# Patient Record
Sex: FEMALE | Race: WHITE | Employment: OTHER | URBAN - METROPOLITAN AREA
[De-identification: names, ages, dates, MRNs, and addresses within clinical notes are randomized per-mention and may not be internally consistent; named-entity substitution may affect disease eponyms.]

---

## 2017-08-11 ENCOUNTER — HOSPITAL ENCOUNTER (OUTPATIENT)
Dept: ENDOSCOPY | Age: 67
Discharge: HOME | End: 2017-08-11
Attending: INTERNAL MEDICINE | Admitting: INTERNAL MEDICINE

## 2019-08-24 ENCOUNTER — EMERGENCY (EMERGENCY)
Facility: HOSPITAL | Age: 69
LOS: 1 days | Discharge: HOME | End: 2019-08-24
Attending: EMERGENCY MEDICINE | Admitting: EMERGENCY MEDICINE
Payer: COMMERCIAL

## 2019-08-24 VITALS
RESPIRATION RATE: 16 BRPM | OXYGEN SATURATION: 98 % | TEMPERATURE: 98 F | SYSTOLIC BLOOD PRESSURE: 130 MMHG | DIASTOLIC BLOOD PRESSURE: 70 MMHG | HEART RATE: 80 BPM

## 2019-08-24 VITALS
TEMPERATURE: 98 F | OXYGEN SATURATION: 97 % | RESPIRATION RATE: 16 BRPM | DIASTOLIC BLOOD PRESSURE: 81 MMHG | WEIGHT: 108.91 LBS | HEART RATE: 80 BPM | HEIGHT: 64 IN | SYSTOLIC BLOOD PRESSURE: 152 MMHG

## 2019-08-24 PROCEDURE — 73030 X-RAY EXAM OF SHOULDER: CPT

## 2019-08-24 PROCEDURE — 73060 X-RAY EXAM OF HUMERUS: CPT | Mod: 26,RT

## 2019-08-24 PROCEDURE — 73030 X-RAY EXAM OF SHOULDER: CPT | Mod: 26,RT

## 2019-08-24 PROCEDURE — 99284 EMERGENCY DEPT VISIT MOD MDM: CPT | Mod: 25

## 2019-08-24 PROCEDURE — 73562 X-RAY EXAM OF KNEE 3: CPT

## 2019-08-24 PROCEDURE — 73060 X-RAY EXAM OF HUMERUS: CPT

## 2019-08-24 PROCEDURE — 73562 X-RAY EXAM OF KNEE 3: CPT | Mod: 26,RT

## 2019-08-24 PROCEDURE — 99284 EMERGENCY DEPT VISIT MOD MDM: CPT

## 2019-08-24 RX ORDER — OXYCODONE HYDROCHLORIDE 5 MG/1
1 TABLET ORAL
Qty: 9 | Refills: 0
Start: 2019-08-24 | End: 2019-08-26

## 2019-08-24 RX ORDER — CYCLOBENZAPRINE HYDROCHLORIDE 10 MG/1
10 TABLET, FILM COATED ORAL ONCE
Refills: 0 | Status: COMPLETED | OUTPATIENT
Start: 2019-08-24 | End: 2019-08-24

## 2019-08-24 RX ORDER — OXYCODONE HYDROCHLORIDE 5 MG/1
5 TABLET ORAL ONCE
Refills: 0 | Status: DISCONTINUED | OUTPATIENT
Start: 2019-08-24 | End: 2019-08-24

## 2019-08-24 RX ORDER — IBUPROFEN 200 MG
600 TABLET ORAL ONCE
Refills: 0 | Status: COMPLETED | OUTPATIENT
Start: 2019-08-24 | End: 2019-08-24

## 2019-08-24 RX ORDER — IBUPROFEN 200 MG
1 TABLET ORAL
Qty: 9 | Refills: 0
Start: 2019-08-24 | End: 2019-08-26

## 2019-08-24 RX ORDER — METFORMIN HYDROCHLORIDE 850 MG/1
0 TABLET ORAL
Qty: 0 | Refills: 0 | DISCHARGE

## 2019-08-24 RX ORDER — CYCLOBENZAPRINE HYDROCHLORIDE 10 MG/1
1 TABLET, FILM COATED ORAL
Qty: 9 | Refills: 0
Start: 2019-08-24 | End: 2019-08-26

## 2019-08-24 RX ORDER — RISEDRONATE SODIUM 25.8; 4.2 MG/1; MG/1
1 TABLET, FILM COATED ORAL
Qty: 0 | Refills: 0 | DISCHARGE

## 2019-08-24 RX ADMIN — CYCLOBENZAPRINE HYDROCHLORIDE 10 MILLIGRAM(S): 10 TABLET, FILM COATED ORAL at 01:58

## 2019-08-24 RX ADMIN — OXYCODONE HYDROCHLORIDE 5 MILLIGRAM(S): 5 TABLET ORAL at 02:36

## 2019-08-24 RX ADMIN — Medication 600 MILLIGRAM(S): at 02:36

## 2019-08-24 RX ADMIN — OXYCODONE HYDROCHLORIDE 5 MILLIGRAM(S): 5 TABLET ORAL at 01:58

## 2019-08-24 RX ADMIN — Medication 600 MILLIGRAM(S): at 01:58

## 2019-08-24 NOTE — ED PROVIDER NOTE - PROGRESS NOTE DETAILS
xray discussed, revd pain meds,   ambulatory  xray discussed, immobilization  expailed, pain meds explaiend, follow up discussed

## 2019-08-24 NOTE — ED PROVIDER NOTE - PHYSICAL EXAMINATION
appears uncomfortable, talking in short sentences  pupils a reactive, eomi,   mm moist, no oral injury, no facial pain  supple, no c/t/l spine tenderness, from, trachea in midline  Gato chest expansion, no chest wall tenderness, no rib tenderness, no deformity, no clavicular pain  S1 S2 distant  abd soft, non tender, no g/r,   no pelvic pain  moves all ext, no swelling noted except right shoulde swelling prox noted, distal arm sensory, motor and capillary intact  knee abrasion noted, distallegs no swelling from both distal lower ext  Neuro aao3, cn intact grossly, no focal deficits  skin no bruises, no swelling

## 2019-08-24 NOTE — ED PROVIDER NOTE - CLINICAL SUMMARY MEDICAL DECISION MAKING FREE TEXT BOX
68y old with fall right shoulder injury, xray, trauma evalaution, pain control, slin, immoblizartion, ambulatn po tolerance  follow up. patient has a daughter abnd a daughter in law in [place]

## 2019-08-24 NOTE — ED ADULT TRIAGE NOTE - CHIEF COMPLAINT QUOTE
'She tripped and fell, landed on right shoulder', s/p fall around 9 pm tonight, c/o pain and unable to lift right arm, right upper arm swollen.

## 2019-08-24 NOTE — ED PROVIDER NOTE - OBJECTIVE STATEMENT
68y odl otherwise in good state of health, with complaints of fall, no loc, complaints of right shoulder and arm pain, pain is sharp, describes ripped, saw herself fall, recall events, did not hit head. patient is visiting

## 2019-08-24 NOTE — ED PROVIDER NOTE - NS ED ROS FT
no weight change, no fever or chills  no rash, no bruises  no visual changes no eye discharge  no cough cold or congestion,   no sob, no chest pain  no abd pain, no n/v/d  no hematuria, no change in urinary habits  no joint pain, no deformity  no headache, no paresthesia

## 2019-08-24 NOTE — ED ADULT NURSE NOTE - NSIMPLEMENTINTERV_GEN_ALL_ED
Implemented All Fall Risk Interventions:  Lee to call system. Call bell, personal items and telephone within reach. Instruct patient to call for assistance. Room bathroom lighting operational. Non-slip footwear when patient is off stretcher. Physically safe environment: no spills, clutter or unnecessary equipment. Stretcher in lowest position, wheels locked, appropriate side rails in place. Provide visual cue, wrist band, yellow gown, etc. Monitor gait and stability. Monitor for mental status changes and reorient to person, place, and time. Review medications for side effects contributing to fall risk. Reinforce activity limits and safety measures with patient and family.

## 2020-10-30 RX ORDER — LORATADINE 10 MG/1
10 TABLET ORAL DAILY PRN
COMMUNITY

## 2020-10-30 RX ORDER — ESCITALOPRAM OXALATE 10 MG/1
20 TABLET ORAL DAILY
COMMUNITY

## 2020-10-30 RX ORDER — METOPROLOL SUCCINATE 25 MG/1
12.5 TABLET, EXTENDED RELEASE ORAL EVERY EVENING
COMMUNITY

## 2020-10-30 RX ORDER — UBIDECARENONE 100 MG
CAPSULE ORAL DAILY
COMMUNITY

## 2020-10-30 RX ORDER — PREDNISONE 1 MG/1
5 TABLET ORAL DAILY
COMMUNITY

## 2020-10-30 RX ORDER — ANTIARTHRITIC COMBINATION NO.2 900 MG
TABLET ORAL DAILY
COMMUNITY

## 2020-10-30 NOTE — PROGRESS NOTES
Name_______________________________________Printed:____________________  Date and time of surgery___11/4/2020  1000_____________________Arrival Time:__0830  hosp-endo______________   1. The instructions given regarding when and if a patient needs to stop oral intake prior to surgery varies. Follow the specific instructions you were given                  XXX___Nothing to eat or to drink after Midnight the night before.                             ____Endoscopy patient follow your DRS instructions-generally you will be doing a part of the prep after Midnight                   ____Carbo loading or ERAS instructions will be given to select patients-if you have been given those instructions -please do the following                           The evening before your surgery after dinner before midnight drink 40 ounces of gatorade. If you are diabetic use sugar free. The morning of surgery drink 40 ounces of water. This needs to be finished 3 hours prior to your surgery start time. 2. Take the following pills with a small sip of water on the morning of surgery________pt. does not want to take any meds prior to procedure___________________________________________                  Do not take blood pressure medications ending in pril or sartan the schuyler prior to surgery or the morning of surgery_   3. Aspirin, Ibuprofen, Advil, Naproxen, Vitamin E and other Anti-inflammatory products and supplements should be stopped for 5 -7days before surgery or as directed by your physician. 4. Check with your Doctor regarding stopping Plavix, Coumadin,Eliquis, Lovenox,Effient,Pradaxa,Xarelto, Fragmin or other blood thinners and follow their instructions. 5. Do not smoke, and do not drink any alcoholic beverages 24 hours prior to surgery. This includes NA Beer. Refrain from the usage of any recreational drugs. 6. You may brush your teeth and gargle the morning of surgery. DO NOT SWALLOW WATER   7.  You MUST make arrangements for a responsible adult to stay on site while you are here and take you home after your surgery. You will not be allowed to leave alone or drive yourself home. It is strongly suggested someone stay with you the first 24 hrs. Your surgery will be cancelled if you do not have a ride home. 8. A parent/legal guardian must accompany a child scheduled for surgery and plan to stay at the hospital until the child is discharged. Please do not bring other children with you. 9. Please wear simple, loose fitting clothing to the hospital.  Natty Alba not bring valuables (money, credit cards, checkbooks, etc.) Do not wear any makeup (including no eye makeup) or nail polish on your fingers or toes. 10. DO NOT wear any jewelry or piercings on day of surgery. All body piercing jewelry must be removed. 11. If you have ___dentures, they will be removed before going to the OR; we will provide you a container. If you wear ___contact lenses or ___glasses, they will be removed; please bring a case for them. 12. Please see your family doctor/pediatrician for a history & physical and/or concerning medications. Bring any test results/reports from your physician's office. PCP__________________Phone___________H&P Appt. Date________             13 If you  have a Living Will and Durable Power of  for Healthcare, please bring in a copy. 15. Notify your Surgeon if you develop any illness between now and surgery  time, cough, cold, fever, sore throat, nausea, vomiting, etc.  Please notify your surgeon if you experience dizziness, shortness of breath or blurred vision between now & the time of your surgery             15. DO NOT shave your operative site 96 hours prior to surgery. For face & neck surgery, men may use an electric razor 48 hours prior to surgery. 16. Shower the night before or morning of surgery using an antibacterial soap or as you have been instructed.              17. To provide excellent care visitors will be limited to one in the room at any given time. 18.  Please bring picture ID and insurance card. 19.  Visit our web site for additional information:  [employer]. [employer]/patient-eprep              20.During flu season no children under the age of 15 are permitted in the hospital for the safety of all patients. 21. If you take a long acting insulin in the evening only  take half of your usual  dose the night  before your procedure              22. If you use a c-pap please bring DOS if staying overnight,             23.For your convenience [facility] has a pharmacy on site to fill your prescriptions. 24. If you use oxygen and have a portable tank please bring it  with you the DOS             25. Bring a complete list of all your medications with name and dose include any supplements. 26. Other__________________________________________   *Please call pre admission testing if you any further questions   [place]         [address]    [phone] [facility]  [phone]   [address] [place] [address]       All above information reviewed with patient in person or by phone. Patient verbalizes understanding. All questions and concerns addressed. Patient/Rep____________________                                                                                                                                    There is a one visitor policy at [facility] for all surgeries and endoscopies. Whether the visitor can stay or will be asked to wait in the car will depend on the current policy and if social distancing can be maintained. The policy is subject to change at any time. Please make sure the visitor has a cell phone that is on,charged and able to accept calls, as this may be the way that the staff communicates with them. Pain management is NO VISITOR policyThe patients ride is expected to remain in the car with a cell phone for communication. If the ride is leaving the hospital grounds please make sure they are back in time for pickup. Have the patient inform the staff on arrival what their rides plans are while the patient is in the facility. At the MAIN there is one visitor allowed. Please note that the visitor policy is subject to change. Will do rapid COVID day of procedure per .

## 2020-11-04 ENCOUNTER — ANESTHESIA (OUTPATIENT)
Dept: ENDOSCOPY | Age: 70
End: 2020-11-04
Payer: MEDICARE

## 2020-11-04 ENCOUNTER — HOSPITAL ENCOUNTER (OUTPATIENT)
Age: 70
Setting detail: OUTPATIENT SURGERY
Discharge: HOME | End: 2020-11-04
Attending: INTERNAL MEDICINE | Admitting: INTERNAL MEDICINE
Payer: MEDICARE

## 2020-11-04 ENCOUNTER — ANESTHESIA EVENT (OUTPATIENT)
Dept: ENDOSCOPY | Age: 70
End: 2020-11-04
Payer: MEDICARE

## 2020-11-04 VITALS
WEIGHT: 124 LBS | SYSTOLIC BLOOD PRESSURE: 134 MMHG | RESPIRATION RATE: 16 BRPM | DIASTOLIC BLOOD PRESSURE: 74 MMHG | HEIGHT: 64 IN | BODY MASS INDEX: 21.17 KG/M2 | OXYGEN SATURATION: 98 % | HEART RATE: 61 BPM | TEMPERATURE: 98 F

## 2020-11-04 VITALS — DIASTOLIC BLOOD PRESSURE: 59 MMHG | OXYGEN SATURATION: 99 % | SYSTOLIC BLOOD PRESSURE: 109 MMHG

## 2020-11-04 LAB — SARS-COV-2, NAAT: NOT DETECTED

## 2020-11-04 PROCEDURE — 88305 TISSUE EXAM BY PATHOLOGIST: CPT

## 2020-11-04 PROCEDURE — C1726 CATH, BAL DIL, NON-VASCULAR: HCPCS | Performed by: INTERNAL MEDICINE

## 2020-11-04 PROCEDURE — 3700000001 HC ADD 15 MINUTES (ANESTHESIA): Performed by: INTERNAL MEDICINE

## 2020-11-04 PROCEDURE — 3700000000 HC ANESTHESIA ATTENDED CARE: Performed by: INTERNAL MEDICINE

## 2020-11-04 PROCEDURE — 2580000003 HC RX 258: Performed by: NURSE ANESTHETIST, CERTIFIED REGISTERED

## 2020-11-04 PROCEDURE — 7100000000 HC PACU RECOVERY - FIRST 15 MIN: Performed by: INTERNAL MEDICINE

## 2020-11-04 PROCEDURE — 3609017700 HC EGD DILATION GASTRIC/DUODENAL STRICTURE: Performed by: INTERNAL MEDICINE

## 2020-11-04 PROCEDURE — 7100000001 HC PACU RECOVERY - ADDTL 15 MIN: Performed by: INTERNAL MEDICINE

## 2020-11-04 PROCEDURE — 7100000011 HC PHASE II RECOVERY - ADDTL 15 MIN: Performed by: INTERNAL MEDICINE

## 2020-11-04 PROCEDURE — 3609012400 HC EGD TRANSORAL BIOPSY SINGLE/MULTIPLE: Performed by: INTERNAL MEDICINE

## 2020-11-04 PROCEDURE — 2580000003 HC RX 258: Performed by: ANESTHESIOLOGY

## 2020-11-04 PROCEDURE — U0002 COVID-19 LAB TEST NON-CDC: HCPCS

## 2020-11-04 PROCEDURE — 2709999900 HC NON-CHARGEABLE SUPPLY: Performed by: INTERNAL MEDICINE

## 2020-11-04 PROCEDURE — 7100000010 HC PHASE II RECOVERY - FIRST 15 MIN: Performed by: INTERNAL MEDICINE

## 2020-11-04 PROCEDURE — 2500000003 HC RX 250 WO HCPCS: Performed by: NURSE ANESTHETIST, CERTIFIED REGISTERED

## 2020-11-04 PROCEDURE — 6360000002 HC RX W HCPCS: Performed by: NURSE ANESTHETIST, CERTIFIED REGISTERED

## 2020-11-04 RX ORDER — PROPOFOL 10 MG/ML
INJECTION, EMULSION INTRAVENOUS PRN
Status: DISCONTINUED | OUTPATIENT
Start: 2020-11-04 | End: 2020-11-04 | Stop reason: SDUPTHER

## 2020-11-04 RX ORDER — SODIUM CHLORIDE 9 MG/ML
INJECTION, SOLUTION INTRAVENOUS CONTINUOUS PRN
Status: DISCONTINUED | OUTPATIENT
Start: 2020-11-04 | End: 2020-11-04 | Stop reason: SDUPTHER

## 2020-11-04 RX ORDER — LIDOCAINE HYDROCHLORIDE 20 MG/ML
INJECTION, SOLUTION EPIDURAL; INFILTRATION; INTRACAUDAL; PERINEURAL PRN
Status: DISCONTINUED | OUTPATIENT
Start: 2020-11-04 | End: 2020-11-04 | Stop reason: SDUPTHER

## 2020-11-04 RX ORDER — SODIUM CHLORIDE 9 MG/ML
INJECTION, SOLUTION INTRAVENOUS CONTINUOUS
Status: DISCONTINUED | OUTPATIENT
Start: 2020-11-04 | End: 2020-11-04 | Stop reason: HOSPADM

## 2020-11-04 RX ADMIN — LIDOCAINE HYDROCHLORIDE 100 MG: 20 INJECTION, SOLUTION EPIDURAL; INFILTRATION; INTRACAUDAL; PERINEURAL at 10:07

## 2020-11-04 RX ADMIN — SODIUM CHLORIDE: 9 INJECTION, SOLUTION INTRAVENOUS at 10:07

## 2020-11-04 RX ADMIN — PROPOFOL 250 MG: 10 INJECTION, EMULSION INTRAVENOUS at 10:07

## 2020-11-04 RX ADMIN — SODIUM CHLORIDE: 9 INJECTION, SOLUTION INTRAVENOUS at 09:23

## 2020-11-04 ASSESSMENT — PAIN SCALES - GENERAL
PAINLEVEL_OUTOF10: 0

## 2020-11-04 ASSESSMENT — PULMONARY FUNCTION TESTS
PIF_VALUE: 0
PIF_VALUE: 1
PIF_VALUE: 0

## 2020-11-04 ASSESSMENT — PAIN - FUNCTIONAL ASSESSMENT: PAIN_FUNCTIONAL_ASSESSMENT: 0-10

## 2020-11-04 ASSESSMENT — ENCOUNTER SYMPTOMS: SHORTNESS OF BREATH: 0

## 2020-11-04 NOTE — H&P
mg by mouth daily      therapeutic multivitamin-minerals (THERAGRAN-M) tablet Take 1 tablet by mouth daily. Allergies:  Adhesive tape; Fentanyl; Other; Oxycodone; Percocet [oxycodone-acetaminophen]; Promethazine hcl; Propoxyphene; Erythromycin; and Penicillins        Social History:   Social History     Tobacco Use    Smoking status: Never Smoker    Smokeless tobacco: Never Used   Substance Use Topics    Alcohol use: Yes     Alcohol/week: 0.0 standard drinks     Comment: occasionally     Family History:   Family History   Problem Relation Age of Onset    Kidney Cancer Mother     Heart Attack Father     Kidney Cancer Sister     Kidney Cancer Brother        PHYSICAL EXAM:      BP (!) 143/78   Pulse 61   Temp 98.1 °F (36.7 °C) (Temporal)   Resp 16   Ht 5' 4\" (1.626 m)   Wt 124 lb (56.2 kg)   SpO2 99%   BMI 21.28 kg/m²  I        Heart:  RRR,  Normal S1S2    Lungs:  CTA Bilat, normal effort    Abdomen:  ND NT      ASA Grade:  ASA 2 - Patient with mild systemic disease with no functional limitations    Mallampati Class:  Class I: Soft palate, uvula, fauces, pillars visible  __________  Class II: Soft palate, uvula, fauces visible  __________   Class III: Soft palate, base of uvula visible  __________  Class IV: Hard palate only visible   __________      ASSESSMENT AND PLAN:    1. Patient is a 78 y.o. female here for EGD with deep sedation  2. Procedure options, risks and benefits reviewed with patient. Patient expresses understanding.

## 2020-11-04 NOTE — PROGRESS NOTES
Reviewed problem list, assessment, H&P, and checklist preoperatively. Scope verified using 2 person system. Family in car in parking lot, Dr. Jono Black to call.

## 2020-11-04 NOTE — PROGRESS NOTES
PT moved to phase II. Pt alert and oriented. Room air. Drinking soda. Denies any pain. Spoke with MD.  Patient education given  and the patient expresses understanding and acceptance of instructions. Aracelis Castro 11/4/2020 11:54 AM  Discharge instructions reviewed with patient/responsible adult. All home medications have been reviewed, questions answered and patient verbalized understanding. Discharge instructions signed and copies given.

## 2020-11-04 NOTE — ANESTHESIA PRE PROCEDURE
Department of Anesthesiology  Preprocedure Note       Name:  Minerva Alicia   Age:  78 y.o.  :  1950                                          MRN:  [id number]         Date:  2020      Surgeon: Erika Morales):  Savannah Anne MD    Procedure: Procedure(s):  EGD DIAGNOSTIC ONLY    Medications prior to admission:   Prior to Admission medications    Medication Sig Start Date End Date Taking? Authorizing Provider   metoprolol succinate (TOPROL XL) 25 MG extended release tablet Take 12.5 mg by mouth every evening    Yes Historical Provider, MD   TACROLIMUS PO Take by mouth daily Takes 5 tabs daily   Yes Historical Provider, MD   predniSONE (DELTASONE) 5 MG tablet Take 5 mg by mouth daily   Yes Historical Provider, MD   loratadine (CLARITIN) 10 MG tablet Take 10 mg by mouth daily   Yes Historical Provider, MD   vitamin D (D3-1000) 25 MCG (1000 UT) CAPS Take by mouth daily   Yes Historical Provider, MD   cyanocobalamin 1000 MCG tablet Take 1,000 mcg by mouth daily   Yes Historical Provider, MD   Biotin 5000 MCG TABS Take by mouth daily   Yes Historical Provider, MD   escitalopram (LEXAPRO) 10 MG tablet Take 10 mg by mouth daily   Yes Historical Provider, MD   therapeutic multivitamin-minerals (THERAGRAN-M) tablet Take 1 tablet by mouth daily. Historical Provider, MD       Current medications:    No current facility-administered medications for this encounter. Allergies:     Allergies   Allergen Reactions    Adhesive Tape      Skin red and blister may use paper tape  Skin red and blister may use paper tape    Fentanyl      hallucinations    Other      darvocet causes hallucinations    Oxycodone      hallucinations    Percocet [Oxycodone-Acetaminophen]     Promethazine Hcl      Hallucinations     Propoxyphene     Erythromycin Nausea Only and Nausea And Vomiting    Penicillins Rash and Swelling       Problem List:    Patient Active Problem List   Diagnosis Code    Cerebral aneurysm, nonruptured I67.1  Intracerebral hemorrhage (HCC) I61.9    Essential hypertension I10    Polycystic kidney Q61.3    Convulsions ([address] R56.9    Sleep apnea G47.30    Depressive disorder, not elsewhere classified F32.9    Allergic rhinitis J30.9    Malignant neoplasm of skin C44.90    Elevated uric acid in blood E79.0    End-stage renal disease (HCC) N18.6    Tendon contracture M62.40    Acquired hallux valgus M20.10    Capsulitis of foot M77.8       Past Medical History:        Diagnosis Date    Allergic rhinitis     Basal cell carcinoma     Capsulitis of foot 9/21/2015    Cerebral aneurysm     Depression     Hemorrhage intracerebral     Hernia of unspecified site of abdominal cavity without mention of obstruction or gangrene 1985    Hypertension     Mitral valve replaced     Polycystic kidney disease     Prolonged emergence from general anesthesia     Spinal stenoses     Unspecified sleep apnea     does not use CPAP       Past Surgical History:        Procedure Laterality Date    BRAIN ANEURYSM SURGERY  2004    HERNIA REPAIR      abdominal several times    HYSTERECTOMY  1994    KIDNEY REMOVAL Bilateral 2014    KIDNEY TRANSPLANT  07/2014    live donor   Eric Spina MITRAL VALVE REPAIR  2008    OTHER SURGICAL HISTORY  2006    basal cell removed from rt breast    OTHER SURGICAL HISTORY  2004    kidney decortication    TONSILLECTOMY      as child       Social History:    Social History     Tobacco Use    Smoking status: Never Smoker    Smokeless tobacco: Never Used   Substance Use Topics    Alcohol use:  Yes     Alcohol/week: 0.0 standard drinks     Comment: occasionally                                Counseling given: Not Answered      Vital Signs (Current):   Vitals:    10/30/20 1122 10/30/20 1132   Weight: 124 lb (56.2 kg) 124 lb (56.2 kg)   Height: 5' 4\" (1.626 m) 5' 4\" (1.626 m)                                              BP Readings from Last 3 Encounters:   09/28/15 105/60   09/21/15 106/61   01/09/14 126/74       NPO Status:                                                                                 BMI:   Wt Readings from Last 3 Encounters:   10/30/20 124 lb (56.2 kg)   10/19/15 128 lb (58.1 kg)   09/28/15 128 lb 15.5 oz (58.5 kg)     Body mass index is 21.28 kg/m². CBC:   Lab Results   Component Value Date    WBC 6.3 06/20/2014    RBC 3.51 06/20/2014    HGB 10.5 06/20/2014    HCT 32.4 06/20/2014    MCV 92.5 06/20/2014    RDW 16.4 06/20/2014     06/20/2014       CMP:   Lab Results   Component Value Date     06/20/2014    K 4.5 06/20/2014    CL 94 06/20/2014    CO2 30 06/20/2014    BUN 25 06/20/2014    CREATININE 4.52 06/20/2014    GFRAA 12 06/20/2014    AGRATIO 1.5 06/20/2014    AGRATIO 1.5 06/20/2014    LABGLOM 10 06/20/2014    LABGLOM 19 12/29/2011    GLUCOSE 94 06/20/2014    GLUCOSE 93 12/29/2011    PROT 6.7 06/20/2014    PROT 6.7 06/20/2014    PROT 6.8 12/29/2011    CALCIUM 9.5 06/20/2014    BILITOT 0.9 06/20/2014    BILITOT 0.9 06/20/2014    ALKPHOS 66 06/20/2014    ALKPHOS 66 06/20/2014    AST 18 06/20/2014    AST 18 06/20/2014    ALT 14 06/20/2014    ALT 14 06/20/2014       POC Tests: No results for input(s): POCGLU, POCNA, POCK, POCCL, POCBUN, POCHEMO, POCHCT in the last 72 hours.     Coags:   Lab Results   Component Value Date    PROTIME 12.0 06/20/2014    INR 1.1 06/20/2014    APTT 35.9 06/20/2014       HCG (If Applicable): No results found for: PREGTESTUR, PREGSERUM, HCG, HCGQUANT     ABGs: No results found for: PHART, PO2ART, XOR0NLL, YBK1WMD, BEART, U8IJPRWZ     Type & Screen (If Applicable):  Lab Results   Component Value Date    LABABO O 06/20/2014    LABRH Positive 06/20/2014       Drug/Infectious Status (If Applicable):  No results found for: HIV, HEPCAB    COVID-19 Screening (If Applicable): No results found for: COVID19      Anesthesia Evaluation  Patient summary reviewed and Nursing notes reviewed no history of anesthetic complications:   Airway: Mallampati: II  TM distance: >3 FB   Neck ROM: full  Mouth opening: > = 3 FB Dental:    (+) caps      Pulmonary:   (+) sleep apnea:      (-) asthma and shortness of breath                           Cardiovascular:    (+) hypertension:, valvular problems/murmurs (s/p MV repair):,     (-)  angina                Neuro/Psych:      (-) CVA            ROS comment: Prior aneurysm GI/Hepatic/Renal:   (+) renal disease (PCKD):,      (-) GERD and liver disease       Endo/Other:        (-) diabetes mellitus, hypothyroidism               Abdominal:           Vascular:     - PVD. Anesthesia Plan      MAC     ASA 3       Induction: intravenous. Anesthetic plan and risks discussed with patient. Plan discussed with CRNA.                   Remy Parker MD   11/4/2020

## 2020-11-04 NOTE — ANESTHESIA POSTPROCEDURE EVALUATION
Department of Anesthesiology  Postprocedure Note    Patient: Tyrone Patricia  MRN: [id number]  YOB: 1950  Date of evaluation: 11/4/2020  Time:  11:56 AM     Procedure Summary     Date:  11/04/20 Room / Location:  [address] [place] [address]    Anesthesia Start:  1007 Anesthesia Stop:  1027    Procedures:       EGD DILATION BALLOON (N/A Abdomen)      EGD BIOPSY (N/A Abdomen) Diagnosis:  (DYSPHAGIA R13.10)    Surgeon:  Yoan Pool MD Responsible Provider:  Maureen Bullock MD    Anesthesia Type:  MAC ASA Status:  3          Anesthesia Type: MAC    Carmen Phase I: Carmen Score: 10    Carmen Phase II: Carmen Score: 10    Last vitals: Reviewed and per EMR flowsheets.        Anesthesia Post Evaluation    Patient location during evaluation: PACU  Level of consciousness: awake  Airway patency: patent  Complications: no  Cardiovascular status: hemodynamically stable  Respiratory status: acceptable

## 2020-11-04 NOTE — PROGRESS NOTES
Pt received into bay 1 from Endo. Pt drowsy, yet arousable. Vss. Pt stable. Report obtained. Pt denies any pain. Will monitor.

## 2021-02-11 ENCOUNTER — OFFICE VISIT (OUTPATIENT)
Dept: PRIMARY CARE CLINIC | Age: 71
End: 2021-02-11
Payer: MEDICARE

## 2021-02-11 DIAGNOSIS — Z01.818 PRE-OP TESTING: Primary | ICD-10-CM

## 2021-02-11 PROCEDURE — 99211 OFF/OP EST MAY X REQ PHY/QHP: CPT | Performed by: NURSE PRACTITIONER

## 2021-02-11 PROCEDURE — G8428 CUR MEDS NOT DOCUMENT: HCPCS | Performed by: NURSE PRACTITIONER

## 2021-02-11 PROCEDURE — G8420 CALC BMI NORM PARAMETERS: HCPCS | Performed by: NURSE PRACTITIONER

## 2021-02-11 NOTE — PROGRESS NOTES
Can Lezama received a viral test for COVID-19. They were educated on isolation and quarantine as appropriate. For any symptoms, they were directed to seek care from their PCP, given contact information to establish with a doctor, directed to an urgent care or the emergency room.

## 2021-02-11 NOTE — PATIENT INSTRUCTIONS

## 2021-02-12 LAB — SARS-COV-2, NAA: NOT DETECTED

## 2021-02-18 ENCOUNTER — HOSPITAL ENCOUNTER (OUTPATIENT)
Dept: ENDOSCOPY | Age: 71
Discharge: HOME | End: 2021-02-18
Payer: MEDICARE

## 2021-02-18 PROCEDURE — 3609015500 HC GASTRIC/DUODENAL MOTILITY &/OR MANOMETRY STUDY

## 2021-02-18 NOTE — PROGRESS NOTES
Time in Room: 0822   Patient verbalized understanding of Esophageal manometry study. Probe inserted into right nostril with no resistance. Study completed. Discharge instructions given. Patient denied further questions, nausea or pain. Walked to exit by this RN.   Time out of Room: 4218

## 2021-12-06 NOTE — PROGRESS NOTES
Patient reached ____ yes  __X___ no   VM instructions left __X__ yes   phone number ___[phone]_____                                ____ no-office notified          Date __12/9/21_______  Time _1000______  Arrival ___0830  hosp-endo___    Nothing to eat or drink after midnight-follow your doctors prep instructions-this may include taking a second dose of your prep after midnight  Responsible adult 25 or older to stay on site while you are here-drive you home-stay with you after  Follow any instructions your doctors office has given you  Bring a complete list of all your medications and supplements including name,dose,how often taken the day of your procedure  If you normally take the following medications in the morning please do so the AM of your procedure with a small sip of water       Heart,blood pressure,seizure,thyroid or breathing medications-use your inhalers       DO NOT take blood pressure medications ending in \"seth\" or \"pril\" the AM of procedure or evening prior  Take half or your normal dose of any long acting insulins the night before your procedure-do not take any diabetic medications the AM of procedure  Follow your doctors instructions regarding stopping or taking  any blood thinners-if you do not have instructions-call them  Any questions call your doctor  Other ______________________________________________________________      COVID TEST        __X_ Covid test to be done 3-5 days prior to scheduled surgery patient aware they are REQUIRED to bring a copy of the negative result DOS-if they receive a positive result to notify their surgeon. If known-indicate where patient is getting covid test done          _____________________________________________    ___ Rapid - DOS    ___ Other________________________________________            Jeannetta Ganong POLICY(subject to change)         There is a one visitor policy at [facility] for all surgeries and endoscopies. Whether the visitor can stay or will be asked to wait in the car will depend on the current policy and if social distancing can be maintained. The policy is subject to change at any time. Please make sure the visitor has a cell phone that is on,charged and able to accept calls, as this may be the way that the staff communicates with them. Pain management is NO VISITOR policyThe patients ride is expected to remain in the car with a cell phone for communication. If the ride is leaving the hospital grounds please make sure they are back in time for pickup. Have the patient inform the staff on arrival what their rides plans are while the patient is in the facility. At the MAIN there is one visitor allowed. Please note that the visitor policy is subject to change.

## 2021-12-09 ENCOUNTER — HOSPITAL ENCOUNTER (OUTPATIENT)
Age: 71
Setting detail: OUTPATIENT SURGERY
Discharge: HOME | End: 2021-12-09
Attending: INTERNAL MEDICINE | Admitting: INTERNAL MEDICINE
Payer: MEDICARE

## 2021-12-09 ENCOUNTER — ANESTHESIA EVENT (OUTPATIENT)
Dept: ENDOSCOPY | Age: 71
End: 2021-12-09
Payer: MEDICARE

## 2021-12-09 ENCOUNTER — ANESTHESIA (OUTPATIENT)
Dept: ENDOSCOPY | Age: 71
End: 2021-12-09
Payer: MEDICARE

## 2021-12-09 VITALS — DIASTOLIC BLOOD PRESSURE: 59 MMHG | OXYGEN SATURATION: 100 % | SYSTOLIC BLOOD PRESSURE: 99 MMHG

## 2021-12-09 VITALS
TEMPERATURE: 97.7 F | BODY MASS INDEX: 19.46 KG/M2 | WEIGHT: 114 LBS | HEIGHT: 64 IN | SYSTOLIC BLOOD PRESSURE: 126 MMHG | RESPIRATION RATE: 16 BRPM | HEART RATE: 56 BPM | OXYGEN SATURATION: 100 % | DIASTOLIC BLOOD PRESSURE: 69 MMHG

## 2021-12-09 PROCEDURE — 7100000011 HC PHASE II RECOVERY - ADDTL 15 MIN: Performed by: INTERNAL MEDICINE

## 2021-12-09 PROCEDURE — 2580000003 HC RX 258: Performed by: ANESTHESIOLOGY

## 2021-12-09 PROCEDURE — 6360000002 HC RX W HCPCS: Performed by: NURSE ANESTHETIST, CERTIFIED REGISTERED

## 2021-12-09 PROCEDURE — 2500000003 HC RX 250 WO HCPCS: Performed by: NURSE ANESTHETIST, CERTIFIED REGISTERED

## 2021-12-09 PROCEDURE — 7100000010 HC PHASE II RECOVERY - FIRST 15 MIN: Performed by: INTERNAL MEDICINE

## 2021-12-09 PROCEDURE — 3700000001 HC ADD 15 MINUTES (ANESTHESIA): Performed by: INTERNAL MEDICINE

## 2021-12-09 PROCEDURE — 2709999900 HC NON-CHARGEABLE SUPPLY: Performed by: INTERNAL MEDICINE

## 2021-12-09 PROCEDURE — 3609027000 HC COLONOSCOPY: Performed by: INTERNAL MEDICINE

## 2021-12-09 PROCEDURE — 3700000000 HC ANESTHESIA ATTENDED CARE: Performed by: INTERNAL MEDICINE

## 2021-12-09 RX ORDER — LABETALOL HYDROCHLORIDE 5 MG/ML
5 INJECTION, SOLUTION INTRAVENOUS EVERY 10 MIN PRN
Status: DISCONTINUED | OUTPATIENT
Start: 2021-12-09 | End: 2021-12-09 | Stop reason: HOSPADM

## 2021-12-09 RX ORDER — SODIUM CHLORIDE 0.9 % (FLUSH) 0.9 %
5-40 SYRINGE (ML) INJECTION PRN
Status: DISCONTINUED | OUTPATIENT
Start: 2021-12-09 | End: 2021-12-09 | Stop reason: HOSPADM

## 2021-12-09 RX ORDER — SODIUM CHLORIDE 0.9 % (FLUSH) 0.9 %
5-40 SYRINGE (ML) INJECTION EVERY 12 HOURS SCHEDULED
Status: DISCONTINUED | OUTPATIENT
Start: 2021-12-09 | End: 2021-12-09 | Stop reason: HOSPADM

## 2021-12-09 RX ORDER — PROPOFOL 10 MG/ML
INJECTION, EMULSION INTRAVENOUS PRN
Status: DISCONTINUED | OUTPATIENT
Start: 2021-12-09 | End: 2021-12-09 | Stop reason: SDUPTHER

## 2021-12-09 RX ORDER — LIDOCAINE HYDROCHLORIDE 20 MG/ML
INJECTION, SOLUTION EPIDURAL; INFILTRATION; INTRACAUDAL; PERINEURAL PRN
Status: DISCONTINUED | OUTPATIENT
Start: 2021-12-09 | End: 2021-12-09 | Stop reason: SDUPTHER

## 2021-12-09 RX ORDER — AZATHIOPRINE 50 MG/1
50 TABLET ORAL DAILY
COMMUNITY

## 2021-12-09 RX ORDER — ONDANSETRON 2 MG/ML
4 INJECTION INTRAMUSCULAR; INTRAVENOUS
Status: DISCONTINUED | OUTPATIENT
Start: 2021-12-09 | End: 2021-12-09 | Stop reason: HOSPADM

## 2021-12-09 RX ORDER — AMLODIPINE BESYLATE 2.5 MG/1
2.5 TABLET ORAL DAILY
COMMUNITY

## 2021-12-09 RX ORDER — SODIUM CHLORIDE 9 MG/ML
25 INJECTION, SOLUTION INTRAVENOUS PRN
Status: DISCONTINUED | OUTPATIENT
Start: 2021-12-09 | End: 2021-12-09 | Stop reason: HOSPADM

## 2021-12-09 RX ORDER — SODIUM CHLORIDE 9 MG/ML
INJECTION, SOLUTION INTRAVENOUS CONTINUOUS
Status: DISCONTINUED | OUTPATIENT
Start: 2021-12-09 | End: 2021-12-09 | Stop reason: HOSPADM

## 2021-12-09 RX ADMIN — LIDOCAINE HYDROCHLORIDE 100 MG: 20 INJECTION, SOLUTION EPIDURAL; INFILTRATION; INTRACAUDAL; PERINEURAL at 10:01

## 2021-12-09 RX ADMIN — PROPOFOL 50 MG: 10 INJECTION, EMULSION INTRAVENOUS at 10:01

## 2021-12-09 RX ADMIN — SODIUM CHLORIDE: 9 INJECTION, SOLUTION INTRAVENOUS at 09:19

## 2021-12-09 RX ADMIN — PROPOFOL 50 MG: 10 INJECTION, EMULSION INTRAVENOUS at 10:05

## 2021-12-09 RX ADMIN — PROPOFOL 50 MG: 10 INJECTION, EMULSION INTRAVENOUS at 10:16

## 2021-12-09 RX ADMIN — PROPOFOL 50 MG: 10 INJECTION, EMULSION INTRAVENOUS at 10:10

## 2021-12-09 ASSESSMENT — PULMONARY FUNCTION TESTS
PIF_VALUE: 0
PIF_VALUE: 1
PIF_VALUE: 0

## 2021-12-09 ASSESSMENT — ENCOUNTER SYMPTOMS: SHORTNESS OF BREATH: 0

## 2021-12-09 ASSESSMENT — PAIN - FUNCTIONAL ASSESSMENT: PAIN_FUNCTIONAL_ASSESSMENT: 0-10

## 2021-12-09 NOTE — ANESTHESIA PRE PROCEDURE
Department of Anesthesiology  Preprocedure Note       Name:  Magui Guerra   Age:  66 y.o.  :  1950                                          MRN:  [id number]         Date:  2021      Surgeon: Nelida Isaacs):  Moose Escalante MD    Procedure: Procedure(s):  COLONOSCOPY DIAGNOSTIC    Medications prior to admission:   Prior to Admission medications    Medication Sig Start Date End Date Taking? Authorizing Provider   azaTHIOprine (IMURAN) 50 MG tablet Take 50 mg by mouth daily    Historical Provider, MD   amLODIPine (NORVASC) 2.5 MG tablet Take 2.5 mg by mouth daily    Historical Provider, MD   metoprolol succinate (TOPROL XL) 25 MG extended release tablet Take 12.5 mg by mouth every evening     Historical Provider, MD   TACROLIMUS PO Take by mouth daily Takes 5 tabs daily    Historical Provider, MD   predniSONE (DELTASONE) 5 MG tablet Take 5 mg by mouth daily    Historical Provider, MD   loratadine (CLARITIN) 10 MG tablet Take 10 mg by mouth daily as needed     Historical Provider, MD   vitamin D (D3-1000) 25 MCG (1000 UT) CAPS Take by mouth daily    Historical Provider, MD   cyanocobalamin 1000 MCG tablet Take 1,000 mcg by mouth daily    Historical Provider, MD   Biotin 5000 MCG TABS Take by mouth daily    Historical Provider, MD   escitalopram (LEXAPRO) 10 MG tablet Take 20 mg by mouth daily     Historical Provider, MD   therapeutic multivitamin-minerals (THERAGRAN-M) tablet Take 1 tablet by mouth daily. Historical Provider, MD       Current medications:    No current facility-administered medications for this visit. No current outpatient medications on file. Facility-Administered Medications Ordered in Other Visits   Medication Dose Route Frequency Provider Last Rate Last Admin    0.9 % sodium chloride infusion   IntraVENous Continuous Raul Shaw MD           Allergies:     Allergies   Allergen Reactions    Adhesive Tape      Skin red and blister may use paper tape  Skin red and blister may use paper tape    Fentanyl      hallucinations    Other      darvocet causes hallucinations    Oxycodone      hallucinations    Percocet [Oxycodone-Acetaminophen]     Promethazine Hcl      Hallucinations     Propoxyphene     Erythromycin Nausea Only and Nausea And Vomiting    Penicillins Rash and Swelling       Problem List:    Patient Active Problem List   Diagnosis Code    Cerebral aneurysm, nonruptured I67.1    Intracerebral hemorrhage (HCC) I61.9    Essential hypertension I10    Polycystic kidney Q61.3    Convulsions ([facility] [address] R56.9    Sleep apnea G47.30    Depressive disorder, not elsewhere classified F32.9    Allergic rhinitis J30.9    Malignant neoplasm of skin C44.90    Elevated uric acid in blood E79.0    End-stage renal disease (HCC) N18.6    Tendon contracture M62.40    Acquired hallux valgus M20.10    Capsulitis of foot M77.8       Past Medical History:        Diagnosis Date    Allergic rhinitis     Arthritis     Basal cell carcinoma     Capsulitis of foot 9/21/2015    Cerebral aneurysm     Depression     Hemorrhage intracerebral     Hernia of unspecified site of abdominal cavity without mention of obstruction or gangrene 1985    Hypertension     Mitral valve replaced     Polycystic kidney disease     Prolonged emergence from general anesthesia     Spinal stenoses     Tachycardia     Unspecified sleep apnea     does not use CPAP       Past Surgical History:        Procedure Laterality Date    ABDOMEN SURGERY      BRAIN ANEURYSM SURGERY  2004    CARDIAC SURGERY      HERNIA REPAIR      abdominal several times    HYSTERECTOMY  1994    KIDNEY REMOVAL Bilateral 2014    KIDNEY TRANSPLANT  07/2014    live donor   [employer] MITRAL VALVE REPAIR  2008   [employer] OTHER SURGICAL HISTORY  2006    basal cell removed from rt breast    OTHER SURGICAL HISTORY  2004    kidney decortication    PARTIAL NEPHRECTOMY      TONSILLECTOMY      as child    TOTAL NEPHRECTOMY Bilateral     UPPER GASTROINTESTINAL ENDOSCOPY N/A 11/4/2020    EGD DILATION BALLOON performed by Corrie Pritchard MD at [facility] N/A 11/4/2020    EGD BIOPSY performed by Corrie Pritchard MD at [address] [place] [address]       Social History:    Social History     Tobacco Use    Smoking status: Never Smoker    Smokeless tobacco: Never Used   Substance Use Topics    Alcohol use: Yes     Alcohol/week: 0.0 standard drinks     Comment: occasionally                                Counseling given: Not Answered      Vital Signs (Current): There were no vitals filed for this visit.                                            BP Readings from Last 3 Encounters:   12/09/21 136/69   11/04/20 134/74   11/04/20 (!) 109/59       NPO Status:                                                                                 BMI:   Wt Readings from Last 3 Encounters:   12/09/21 114 lb (51.7 kg)   11/04/20 124 lb (56.2 kg)   10/19/15 128 lb (58.1 kg)     There is no height or weight on file to calculate BMI.    CBC:   Lab Results   Component Value Date    WBC 6.3 06/20/2014    RBC 3.51 06/20/2014    HGB 10.5 06/20/2014    HCT 32.4 06/20/2014    MCV 92.5 06/20/2014    RDW 16.4 06/20/2014     06/20/2014       CMP:   Lab Results   Component Value Date     06/20/2014    K 4.5 06/20/2014    CL 94 06/20/2014    CO2 30 06/20/2014    BUN 25 06/20/2014    CREATININE 4.52 06/20/2014    GFRAA 12 06/20/2014    AGRATIO 1.5 06/20/2014    AGRATIO 1.5 06/20/2014    LABGLOM 10 06/20/2014    LABGLOM 19 12/29/2011    GLUCOSE 94 06/20/2014    GLUCOSE 93 12/29/2011    PROT 6.7 06/20/2014    PROT 6.7 06/20/2014    PROT 6.8 12/29/2011    CALCIUM 9.5 06/20/2014    BILITOT 0.9 06/20/2014    BILITOT 0.9 06/20/2014    ALKPHOS 66 06/20/2014    ALKPHOS 66 06/20/2014    AST 18 06/20/2014    AST 18 06/20/2014    ALT 14 06/20/2014    ALT 14 06/20/2014       POC Tests: No results for input(s): POCGLU, POCNA, POCK, POCCL, POCBUN, POCHEMO, POCHCT in the last 72 hours. Coags:   Lab Results   Component Value Date    PROTIME 12.0 06/20/2014    INR 1.1 06/20/2014    APTT 35.9 06/20/2014       HCG (If Applicable): No results found for: PREGTESTUR, PREGSERUM, HCG, HCGQUANT     ABGs: No results found for: PHART, PO2ART, LIJ9XGQ, WCQ5XCG, BEART, H7PVONHP     Type & Screen (If Applicable):  Lab Results   Component Value Date    LABABO O 06/20/2014    LABRH Positive 06/20/2014       Drug/Infectious Status (If Applicable):  No results found for: HIV, HEPCAB    COVID-19 Screening (If Applicable):   Lab Results   Component Value Date    COVID19 NOT DETECTED 02/11/2021         Anesthesia Evaluation  Patient summary reviewed and Nursing notes reviewed no history of anesthetic complications:   Airway: Mallampati: II  TM distance: >3 FB   Neck ROM: full  Mouth opening: > = 3 FB Dental:    (+) caps      Pulmonary:   (+) sleep apnea:      (-) asthma and shortness of breath                           Cardiovascular:    (+) hypertension:, valvular problems/murmurs (s/p MV repair):,     (-)  angina                Neuro/Psych:   (+) seizures:,    (-) CVA            ROS comment: Prior aneurysm GI/Hepatic/Renal:   (+) renal disease (PCKD):,      (-) GERD and liver disease       Endo/Other:        (-) diabetes mellitus, hypothyroidism               Abdominal:             Vascular:     - PVD. Other Findings:               Anesthesia Plan      MAC     ASA 3       Induction: intravenous. Anesthetic plan and risks discussed with patient. Plan discussed with CRNA.                   Yony Hughes MD   12/9/2021

## 2021-12-09 NOTE — ANESTHESIA POSTPROCEDURE EVALUATION
Department of Anesthesiology  Postprocedure Note    Patient: Cisco Gaines  MRN: [id number]  YOB: 1950  Date of evaluation: 12/9/2021  Time:  1:14 PM     Procedure Summary     Date: 12/09/21 Room / Location: [address] [place] [address]    Anesthesia Start: 8195 Anesthesia Stop: 1030    Procedure: COLONOSCOPY DIAGNOSTIC (N/A ) Diagnosis:       (COLON CANCER SCREENING Z12.11)      (FAMILY HISTORY OF COLON CANCER Z80.0)    Surgeons: Umberto García MD Responsible Provider: Victor M Louis MD    Anesthesia Type: MAC ASA Status: 3          Anesthesia Type: MAC    Carmen Phase I: Carmen Score: 10    Carmen Phase II:      Last vitals: Reviewed and per EMR flowsheets.        Anesthesia Post Evaluation    Patient location during evaluation: PACU  Patient participation: complete - patient participated  Level of consciousness: awake and alert  Complications: no  Cardiovascular status: blood pressure returned to baseline  Respiratory status: acceptable  Hydration status: euvolemic

## 2021-12-09 NOTE — H&P
Gastroenterology Note             Pre-operative History and Physical    Patient: Joey Robison  : 1950  CSN:     History Obtained From:  patient and/or guardian. HISTORY OF PRESENT ILLNESS:    The patient is a 66 y.o. female  here for colonoscopy. Past Medical History:    Past Medical History:   Diagnosis Date    Allergic rhinitis     Arthritis     Basal cell carcinoma     Capsulitis of foot 2015    Cerebral aneurysm     Depression     Hemorrhage intracerebral     Hernia of unspecified site of abdominal cavity without mention of obstruction or gangrene 1985    Hypertension     Mitral valve replaced     Polycystic kidney disease     Prolonged emergence from general anesthesia     Spinal stenoses     Tachycardia     Unspecified sleep apnea     does not use CPAP     Past Surgical History:    Past Surgical History:   Procedure Laterality Date    ABDOMEN SURGERY      BRAIN ANEURYSM SURGERY      CARDIAC SURGERY      HERNIA REPAIR      abdominal several times    HYSTERECTOMY  1994    KIDNEY REMOVAL Bilateral     KIDNEY TRANSPLANT  2014    live donor   [address]     [facility] OTHER SURGICAL HISTORY      basal cell removed from rt breast    OTHER SURGICAL HISTORY      kidney decortication    PARTIAL NEPHRECTOMY      TONSILLECTOMY      as child    TOTAL NEPHRECTOMY Bilateral     UPPER GASTROINTESTINAL ENDOSCOPY N/A 2020    EGD DILATION BALLOON performed by Asaf Orozco MD at [facility] N/A 2020    EGD BIOPSY performed by Asaf Orozco MD at [address] [place] [address]     Medications Prior to Admission:   No current facility-administered medications on file prior to encounter.      Current Outpatient Medications on File Prior to Encounter   Medication Sig Dispense Refill    azaTHIOprine (IMURAN) 50 MG tablet Take 50 mg by mouth daily      amLODIPine (NORVASC) 2.5 MG tablet Take 2.5 mg by mouth daily      metoprolol succinate (TOPROL XL) 25 MG extended release tablet Take 12.5 mg by mouth every evening       escitalopram (LEXAPRO) 10 MG tablet Take 20 mg by mouth daily       TACROLIMUS PO Take by mouth daily Takes 5 tabs daily      predniSONE (DELTASONE) 5 MG tablet Take 5 mg by mouth daily      loratadine (CLARITIN) 10 MG tablet Take 10 mg by mouth daily as needed       vitamin D (D3-1000) 25 MCG (1000 UT) CAPS Take by mouth daily      cyanocobalamin 1000 MCG tablet Take 1,000 mcg by mouth daily      Biotin 5000 MCG TABS Take by mouth daily      therapeutic multivitamin-minerals (THERAGRAN-M) tablet Take 1 tablet by mouth daily. Allergies:  Adhesive tape, Fentanyl, Other, Oxycodone, Percocet [oxycodone-acetaminophen], Promethazine hcl, Propoxyphene, Erythromycin, and Penicillins      Social History:   Social History     Tobacco Use    Smoking status: Never Smoker    Smokeless tobacco: Never Used   Substance Use Topics    Alcohol use: Yes     Alcohol/week: 0.0 standard drinks     Comment: occasionally     Family History:   Family History   Problem Relation Age of Onset    Kidney Cancer Mother     Heart Attack Father     Kidney Cancer Sister     Kidney Cancer Brother        PHYSICAL EXAM:      /69   Pulse 66   Temp 97.1 °F (36.2 °C) (Temporal)   Resp 20   Ht 5' 4\" (1.626 m)   Wt 114 lb (51.7 kg)   SpO2 100%   BMI 19.57 kg/m²  I        Heart:   RRR, normal s1s2    Lungs:  CTA bilat,  Normal effort    Abdomen:   NT, ND      ASA Grade:  ASA 3 - Patient with moderate systemic disease with functional limitations    Mallampati Class:  Class I: Soft palate, uvula, fauces, pillars visible  __________  Class II: Soft palate, uvula, fauces visible  __________   Class III: Soft palate, base of uvula visible  __________  Class IV: Hard palate only visible   __________        ASSESSMENT AND PLAN:    1.   Patient is a 66 y.o. female here for colonoscopy with MAC.   2. Procedure options, risks and benefits reviewed with patient. Patient expresses understanding.

## 2024-11-28 NOTE — ED ADULT NURSE NOTE - NS ED NOTE ABUSE RESPONSE YN
Pt presents to ED w/ c/o rash behind her ears and neck area. Pt reports she first noticed the rash three days. Pt does report recently using hair dye in her hair and believes it could be from the dye. AAOx4. NAD noted.  
Yes

## (undated) DEVICE — GOWN AURORA NONREINF LG: Brand: MEDLINE INDUSTRIES, INC.

## (undated) DEVICE — FORCEPS BX L240CM WRK CHN 2.8MM STD CAP W/ NDL MIC MESH

## (undated) DEVICE — SYRINGE INFL 60ML DISP ALLIANCE II

## (undated) DEVICE — MOUTHPIECE ENDOSCP L CTRL OPN AND SIDE PORTS DISP

## (undated) DEVICE — DILATOR ENDOSCP L180CM DIA6FR BLLN L8CM DIA54-60FR

## (undated) DEVICE — PROCEDURE KIT ENDOSCP CUST

## (undated) DEVICE — SOLUTION IV IRRIG WATER 500ML POUR BRL ST 2F7113

## (undated) DEVICE — ESOPHAGEAL BALLOON DILATATION CATHETER: Brand: CRE FIXED WIRE

## (undated) DEVICE — BW-412T DISP COMBO CLEANING BRUSH: Brand: SINGLE USE COMBINATION CLEANING BRUSH

## (undated) DEVICE — SET VLV 3 PC AWS DISPOSABLE GRDIAN SCOPEVALET